# Patient Record
Sex: FEMALE | Race: WHITE | NOT HISPANIC OR LATINO | Employment: FULL TIME | URBAN - METROPOLITAN AREA
[De-identification: names, ages, dates, MRNs, and addresses within clinical notes are randomized per-mention and may not be internally consistent; named-entity substitution may affect disease eponyms.]

---

## 2017-11-09 ENCOUNTER — TRANSCRIBE ORDERS (OUTPATIENT)
Dept: PHYSICAL THERAPY | Facility: CLINIC | Age: 32
End: 2017-11-09

## 2017-11-09 DIAGNOSIS — M62.00 DIASTASIS OF MUSCLE: Primary | ICD-10-CM

## 2017-11-15 ENCOUNTER — TREATMENT (OUTPATIENT)
Dept: PHYSICAL THERAPY | Facility: CLINIC | Age: 32
End: 2017-11-15

## 2017-11-15 DIAGNOSIS — M62.08 DIASTASIS OF RECTUS ABDOMINIS: Primary | ICD-10-CM

## 2017-11-15 DIAGNOSIS — M53.3 SI (SACROILIAC) JOINT DYSFUNCTION: ICD-10-CM

## 2017-11-15 PROCEDURE — 97112 NEUROMUSCULAR REEDUCATION: CPT | Performed by: PHYSICAL THERAPIST

## 2017-11-15 PROCEDURE — 97161 PT EVAL LOW COMPLEX 20 MIN: CPT | Performed by: PHYSICAL THERAPIST

## 2017-11-15 PROCEDURE — 97140 MANUAL THERAPY 1/> REGIONS: CPT | Performed by: PHYSICAL THERAPIST

## 2017-11-15 NOTE — PROGRESS NOTES
Physical Therapy Initial Evaluation and Plan of Care    TIME IN 2:00 TIME OUT 3:15    Patient: Breanna Haley   : 1985  Diagnosis/ICD-10 Code:  Diastasis of rectus abdominis [M62.08]  Referring practitioner: Barbie Mahan MD  Date of Initial Visit: 11/15/2017  Today's Date: 11/15/2017  Patient seen for 1 sessions           Subjective Questionnaire: Oswestry: 30%      Subjective Evaluation    History of Present Illness  Date of onset: 5/15/2017  Mechanism of injury: Patient reports she had a  with her last child.  No complications.  She was able to continue to exercise but felt like she may have overdone as she had another toddler to care for and actually ripped open her sutures shortly after.  Last May went to [employer] and was carrying kids,  wearing back pack, lifting heavy suitcase. Felt like she might have torn something.  Gradual increase right lower quadrant pain and right SI since then.  Now working out makes it worse. Ok to walk.   Straining aggravates it.    Subjective comment:   No radiation into LE,  no numbness or tingling.  Denies bladder or bowel dysfunction.  (+) SI popping especially lift leg to dress  Patient Occupation: PA at [employer]   Precautions and Work Restrictions: NonePain  Location: right lower quadrant pain, right anterior hip with SI. Increases as day progresses  Quality: dull ache (tearing at umbilicus)  Relieving factors: change in position and rest (bath)  Exacerbated by: carrying baby on right hip, lifting, different everyday, SI - stairs, walking.  Progression: worsening    Social Support  Lives in: multiple-level home  Lives with: spouse and young children    Diagnostic Tests  CT scan: abnormal (mild rectus diastasis with convex bulgin of perumbilical linea alba, umbilical hernia)    Treatments  Previous treatment: medication  Current treatment: physical therapy  Patient Goals  Patient goals for therapy: decreased pain and return to sport/leisure activities              Objective     Special Questions      Additional Special Questions  Unremarkable      Postural Observations    Additional Postural Observation Details  Right upslip noted in standing and supine with apparent long left leg.    Palpation     Additional Palpation Details  Inguinal ligament    Tenderness     Additional Tenderness Details  AIIS    Neurological Testing   Sensation     Lumbar   Left   Intact: light touch    Right   Intact: light touch    Active Range of Motion     Lumbar   Flexion: 110 (hypermobile) degrees WFL  Extension: 20 (SI) degrees with pain  Left lateral flexion: 30 (SI) degrees with pain  Right lateral flexion: 22 degrees     Strength/Myotome Testing     Lumbar   Left   Normal strength    Right Hip   Planes of Motion   Flexion: 4  Extension: 5  Abduction: 4  Adduction: 4+  External rotation: 4+  Internal rotation: 4+    Right Knee   Flexion: 5  Extension: 5    Right Ankle/Foot   Dorsiflexion: 5  Plantar flexion: 5  Great toe extension: 5    Additional Strength Details  Left quadratus lumborum 4/5    Muscle Activation   Patient able to activate right transverse abdominals.     Tests     Lumbar     Right   Negative passive lumbar instability, passive SLR and valsalva.     Right Pelvic Girdle/Sacrum   Positive: thigh thrust.   Negative: sacrum compression and gapping.     Right Hip   Positive Gaenslen's.     Additional Tests Details  (+) active SLR with SI bracing, no improvement with abdominal spinting  Right hip circumduction with pain. Decreased right hip mobility  Guarding vs joint mobility.    General Comments     Spine Comments  Diastasis check with TA:  No increase palpable  With head lift: 2.5-3 fingers proximal linea alba         Assessment & Plan     Assessment  Impairments: abnormal muscle firing, abnormal muscle tone, activity intolerance, impaired physical strength and pain with function  Assessment details: Patient is a 33 yo female with onset of right lower quadrant pain after  lifting and carrying heavy loads.  H/o  with decreased healing.  Patient also presents with right SI dysfunction.  Patient appears to have 2.5-3 finger gap at rectus abdominis with active contraction.  Patient has a h/o joint hypermobility.  She does not have any neural or lumbar s/s at this time.  Lumbar spine is unremarkable.  Pain with right hip mobility testing with guarding vs. Joint capsule hypomobility. Patient has weakness in abdominal and lumbar stabilizers.  Slight weakness in hip and with attempts at LE while stabilizing with core.   Prognosis: good  Functional Limitations: carrying objects, lifting, walking, pulling, pushing, uncomfortable because of pain and reaching overhead  Goals  Plan Goals: STG X 3 weeks   Pt will:  1. Tolerate initial HEP  2. Perform advanced TrA retraining exercises with lumbar stability.  3. Increase right hip strength 1/2 grade while maintaining correct pelvic stabilitzation.    6 weeks. Pt will:  1. Report pain of </= 2/10 with all ADLs  2. Demonstrate pain free hip AROM while maintaining neutral spine and pelvic alignment  3. Oswestry </= 10%  4. Walk, stairs, and lifting child without increased strain or tearing sensation  5. Decrease diastasis by 1/2 finger.            Plan  Therapy options: will be seen for skilled physical therapy services  Planned modality interventions: cryotherapy, ultrasound, thermotherapy (hydrocollator packs) and high voltage pulsed current (pain management)  Planned therapy interventions: abdominal trunk stabilization, manual therapy, motor coordination training, neuromuscular re-education, body mechanics training, postural training, soft tissue mobilization, strengthening, stretching, joint mobilization and functional ROM exercises  Frequency: 2x week  Duration in weeks: 6  Treatment plan discussed with: patient        Manual Therapy:    8     mins  22969;  Therapeutic Exercise:    5    mins  29750;     Neuromuscular Freddie:    10    mins   53502;    Therapeutic Activity:           mins  25910;     Gait Training:            mins  32903;     Ultrasound:           mins  15165;    Electrical Stimulation:          mins  87207 ( );  Dry Needling           mins self-pay    Timed Treatment:   23   mins   Total Treatment:     75   mins    PT SIGNATURE: Ember Mark, PT   DATE TREATMENT INITIATED: 11/15/2017    Initial Certification  Certification Period: 2/13/2018  I certify that the therapy services are furnished while this patient is under my care.  The services outlined above are required by this patient, and will be reviewed every 90 days.     PHYSICIAN: Barbie Mahan MD      DATE:     Please sign and return via fax to [phone].. Thank you, [facility].

## 2017-11-16 NOTE — PATIENT INSTRUCTIONS
Patient was educated on findings of evaluation, purpose of treatment, and goals for therapy.  Treatment options discussed and questions answered.  Patient was educated on exercises/self treatment/pain relief techniques.  Please view [facility] Breanna Haley for a complete list of HEP instructions.

## 2017-11-20 ENCOUNTER — TREATMENT (OUTPATIENT)
Dept: PHYSICAL THERAPY | Facility: CLINIC | Age: 32
End: 2017-11-20

## 2017-11-20 DIAGNOSIS — M62.08 DIASTASIS OF RECTUS ABDOMINIS: Primary | ICD-10-CM

## 2017-11-20 DIAGNOSIS — M53.3 SI (SACROILIAC) JOINT DYSFUNCTION: ICD-10-CM

## 2017-11-20 PROCEDURE — 97140 MANUAL THERAPY 1/> REGIONS: CPT | Performed by: PHYSICAL THERAPIST

## 2017-11-20 PROCEDURE — 97110 THERAPEUTIC EXERCISES: CPT | Performed by: PHYSICAL THERAPIST

## 2017-11-20 PROCEDURE — 97035 APP MDLTY 1+ULTRASOUND EA 15: CPT | Performed by: PHYSICAL THERAPIST

## 2017-11-20 NOTE — PROGRESS NOTES
Physical Therapy Daily Progress Note    Time In 3:35  Time Out 4:35    Visit # : 2  Breanna Haley reports: sore. Stiff at anterior hip.  Ripping sensation at abdominals has not happened recently.    Subjective     Objective   See Exercise, Manual, and Modality Logs for complete treatment.   Tender anterior iliopsoas, none at adductors and painfree adduction  Right upslip with AINN    Assessment/Plan  Mild upslip with anterior innominate rotation.  Corrected with muscle technique. Good rectus approximation in quadraped.  No pain with new but fatigued.  Progress strengthening /stabilization /functional activity  Add heel slide, add march.    Assess new exercises and ultrasound.         Manual Therapy:    10     mins  55055;  Therapeutic Exercise:    30     mins  40397;     Neuromuscular Freddie:         mins  80882;    Therapeutic Activity:           mins  90084;     Gait Training:            mins  66523;     Ultrasound:     6/8     mins  18453;    Electrical Stimulation:          mins  18662 ( );  Dry Needling           mins self-pay    Timed Treatment:   38  mins   Total Treatment:     60   mins    Ember Mark, PT  Physical Therapist

## 2017-11-29 ENCOUNTER — TREATMENT (OUTPATIENT)
Dept: PHYSICAL THERAPY | Facility: CLINIC | Age: 32
End: 2017-11-29

## 2017-11-29 DIAGNOSIS — M53.3 SI (SACROILIAC) JOINT DYSFUNCTION: ICD-10-CM

## 2017-11-29 DIAGNOSIS — M62.08 DIASTASIS OF RECTUS ABDOMINIS: Primary | ICD-10-CM

## 2017-11-29 PROCEDURE — 97110 THERAPEUTIC EXERCISES: CPT | Performed by: PHYSICAL THERAPIST

## 2017-11-29 PROCEDURE — 97140 MANUAL THERAPY 1/> REGIONS: CPT | Performed by: PHYSICAL THERAPIST

## 2017-11-29 NOTE — PROGRESS NOTES
Physical Therapy Daily Progress Note    Time In 2:30  Time Out 4:00    Visit # : 3  Breanna Haley reports: SI is more sore but my hip feels good.  Seems to flareup when my child sits on my right hip. Didn't start to hurt again until Sunday.    Subjective     Objective   See Exercise, Manual, and Modality Logs for complete treatment.   Right upslip with AINN    Assessment/Plan  Patient presents with increased SI pain/grinding.  Corrected after mobilized and in alignment.  Decreased tenderness at iliopsoas with increased hip ROM.  Progress strengthening /stabilization /functional activity  Assess SI ultrasound  Vs iliopsoas         Manual Therapy:    8     mins  01188;  Therapeutic Exercise:    30     mins  59467;     Neuromuscular Freddie:         mins  60424;    Therapeutic Activity:           mins  97030;     Gait Training:            mins  91318;     Ultrasound:     6/8     mins  50422;    Electrical Stimulation:          mins  88280 ( );  Dry Needling           mins self-pay    Timed Treatment:   46   mins   Total Treatment:     90   mins    Ember Mark, PT  Physical Therapist

## 2017-12-06 ENCOUNTER — TREATMENT (OUTPATIENT)
Dept: PHYSICAL THERAPY | Facility: CLINIC | Age: 32
End: 2017-12-06

## 2017-12-06 DIAGNOSIS — M53.3 SI (SACROILIAC) JOINT DYSFUNCTION: ICD-10-CM

## 2017-12-06 DIAGNOSIS — M62.08 DIASTASIS OF RECTUS ABDOMINIS: Primary | ICD-10-CM

## 2017-12-06 DIAGNOSIS — S29.012A STRAIN OF RHOMBOID MUSCLE, INITIAL ENCOUNTER: ICD-10-CM

## 2017-12-06 PROCEDURE — 97035 APP MDLTY 1+ULTRASOUND EA 15: CPT | Performed by: PHYSICAL THERAPIST

## 2017-12-06 PROCEDURE — 97530 THERAPEUTIC ACTIVITIES: CPT | Performed by: PHYSICAL THERAPIST

## 2017-12-06 PROCEDURE — 97014 ELECTRIC STIMULATION THERAPY: CPT | Performed by: PHYSICAL THERAPIST

## 2017-12-06 PROCEDURE — 97110 THERAPEUTIC EXERCISES: CPT | Performed by: PHYSICAL THERAPIST

## 2017-12-06 NOTE — PROGRESS NOTES
Physical Therapy Daily Progress Note    Time In 10:30  Time Out 11:25    Visit # : 4  Breanna Haley reports: my SI was bothering me after I did more outside.  I did the self corrections and went to bed.  It was okay the next day.  My scapula is bothering me more today. Has been sore and in spasm at night for several months.  No injury but I hold my son with that arm a lot. No significant right hip pain the last two weeks.      Subjective     Objective   See Exercise, Manual, and Modality Logs for complete treatment.   Right shoulder AROM - full all direction  Serratus 4/5  Low trap 4-/5  Mid trap 4/5  Rhomboid 4/5 pain with all  No numbness or tingling  No tenderness spine or rib  Tender right rhomboid, mid trap  No pain with pa ribs  Mild right AINN, inflare.    Assessment/Plan  Patient presents with chronic scapular strain on right UE. Weak and painful on muscle testing.  No apparent joint or neuro involvement.   Progress per Plan of Care  Add treatment to right scapula to plan of care.  Manual, there. Ex, ultrasound, estim, and taping.         Manual Therapy:    5      mins  05617;  Therapeutic Exercise:    10    mins  43650;     Neuromuscular Freddie:         mins  32077;    Therapeutic Activity:     15      mins  26698;     Gait Training:            mins  17530;     Ultrasound:     6/8     mins  47900;    Electrical Stimulation:    15     mins  71140 ( );  Dry Needling           mins self-pay    Timed Treatment:   38   mins   Total Treatment:     55   mins    Ember Mark, PT  Physical Therapist

## 2017-12-20 ENCOUNTER — TREATMENT (OUTPATIENT)
Dept: PHYSICAL THERAPY | Facility: CLINIC | Age: 32
End: 2017-12-20

## 2017-12-20 DIAGNOSIS — M53.3 SI (SACROILIAC) JOINT DYSFUNCTION: ICD-10-CM

## 2017-12-20 DIAGNOSIS — S29.012A STRAIN OF RHOMBOID MUSCLE, INITIAL ENCOUNTER: ICD-10-CM

## 2017-12-20 DIAGNOSIS — M62.08 DIASTASIS OF RECTUS ABDOMINIS: Primary | ICD-10-CM

## 2017-12-20 PROCEDURE — 97530 THERAPEUTIC ACTIVITIES: CPT | Performed by: PHYSICAL THERAPIST

## 2017-12-20 PROCEDURE — 97014 ELECTRIC STIMULATION THERAPY: CPT | Performed by: PHYSICAL THERAPIST

## 2017-12-20 PROCEDURE — 97110 THERAPEUTIC EXERCISES: CPT | Performed by: PHYSICAL THERAPIST

## 2017-12-20 NOTE — PROGRESS NOTES
Physical Therapy Daily Progress Note    Time In 10:30  Time Out 11:30    Visit # : 5  Breanna Haley reports: I really don't have much pain any where now.    Subjective     Objective       Palpation   Left   Tenderness of the upper trapezius.     Right Tenderness of the upper trapezius.     Additional Palpation Details  No tenderness    Active Range of Motion     Lumbar   Normal active range of motion    Additional Active Range of Motion Details  No pain    Tests     Lumbar     Left   Negative passive SLR.     Right   Negative passive SLR.     Left Pelvic Girdle/Sacrum   Negative: sacrum compression, gapping and thigh thrust.     Right Pelvic Girdle/Sacrum   Negative: sacrum compression and gapping.     Left Hip   Negative Gaenslen's.     Right Hip   Negative Gaenslen's.      See Exercise, Manual, and Modality Logs for complete treatment.   Bilateral hips 5/5 without pain at SI or abdominals.    Assessment/Plan  No positive testing, no pain at abdomen, SI joint, or scapula.  Improved strength.  Progress strengthening /stabilization /functional activity  Continue PRN for strengthening         Manual Therapy:          mins  04636;  Therapeutic Exercise:    30     mins  50833;     Neuromuscular Freddie:         mins  28840;    Therapeutic Activity:     10     mins  99392;     Gait Training:            mins  48286;     Ultrasound:           mins  19838;    Electrical Stimulation:     15     mins  97113 ( );  Dry Needling           mins self-pay    Timed Treatment:   40   mins   Total Treatment:     60   mins    Ember Mark, PT  Physical Therapist

## 2018-05-02 ENCOUNTER — DOCUMENTATION (OUTPATIENT)
Dept: PHYSICAL THERAPY | Facility: CLINIC | Age: 33
End: 2018-05-02

## 2018-05-02 NOTE — PROGRESS NOTES
Discharge Summary  Discharge Summary from Physical Therapy Report      Dates  PT visit: 11/15/17-12/20/17  Number of Visits: 5     Discharge Status of Patient: See  Note dated 12/20/17    Goals: All Met    Discharge Plan: Continue with current home exercise program as instructed    Comments  Patient was to return as needed, no prior visits requested therefore formal discharge today.    Date of Discharge 5/2/18        Ember Mark, PT  Physical Therapist

## 2019-07-23 ENCOUNTER — OFFICE (AMBULATORY)
Dept: URBAN - METROPOLITAN AREA CLINIC 75 | Facility: CLINIC | Age: 34
End: 2019-07-23

## 2019-07-23 VITALS
WEIGHT: 192 LBS | HEIGHT: 64 IN | SYSTOLIC BLOOD PRESSURE: 120 MMHG | DIASTOLIC BLOOD PRESSURE: 76 MMHG | HEART RATE: 61 BPM

## 2019-07-23 DIAGNOSIS — R63.4 ABNORMAL WEIGHT LOSS: ICD-10-CM

## 2019-07-23 DIAGNOSIS — K21.9 GASTRO-ESOPHAGEAL REFLUX DISEASE WITHOUT ESOPHAGITIS: ICD-10-CM

## 2019-07-23 DIAGNOSIS — R19.7 DIARRHEA, UNSPECIFIED: ICD-10-CM

## 2019-07-23 DIAGNOSIS — R63.0 ANOREXIA: ICD-10-CM

## 2019-07-23 DIAGNOSIS — R11.0 NAUSEA: ICD-10-CM

## 2019-07-23 DIAGNOSIS — R10.13 EPIGASTRIC PAIN: ICD-10-CM

## 2019-07-23 PROCEDURE — 99203 OFFICE O/P NEW LOW 30 MIN: CPT | Performed by: INTERNAL MEDICINE

## 2019-08-13 VITALS
OXYGEN SATURATION: 100 % | OXYGEN SATURATION: 97 % | HEART RATE: 78 BPM | DIASTOLIC BLOOD PRESSURE: 60 MMHG | SYSTOLIC BLOOD PRESSURE: 115 MMHG | HEART RATE: 51 BPM | HEART RATE: 59 BPM | TEMPERATURE: 96.8 F | DIASTOLIC BLOOD PRESSURE: 52 MMHG | DIASTOLIC BLOOD PRESSURE: 59 MMHG | RESPIRATION RATE: 20 BRPM | DIASTOLIC BLOOD PRESSURE: 75 MMHG | RESPIRATION RATE: 14 BRPM | DIASTOLIC BLOOD PRESSURE: 72 MMHG | WEIGHT: 192 LBS | HEIGHT: 64 IN | RESPIRATION RATE: 18 BRPM | HEART RATE: 54 BPM | HEART RATE: 60 BPM | SYSTOLIC BLOOD PRESSURE: 106 MMHG | OXYGEN SATURATION: 99 % | SYSTOLIC BLOOD PRESSURE: 116 MMHG | HEART RATE: 65 BPM | RESPIRATION RATE: 8 BRPM | SYSTOLIC BLOOD PRESSURE: 114 MMHG | TEMPERATURE: 96.7 F

## 2019-08-14 ENCOUNTER — OFFICE (AMBULATORY)
Dept: URBAN - METROPOLITAN AREA PATHOLOGY 4 | Facility: PATHOLOGY | Age: 34
End: 2019-08-14
Payer: COMMERCIAL

## 2019-08-14 ENCOUNTER — AMBULATORY SURGICAL CENTER (AMBULATORY)
Dept: URBAN - METROPOLITAN AREA SURGERY 17 | Facility: SURGERY | Age: 34
End: 2019-08-14
Payer: COMMERCIAL

## 2019-08-14 DIAGNOSIS — K21.0 GASTRO-ESOPHAGEAL REFLUX DISEASE WITH ESOPHAGITIS: ICD-10-CM

## 2019-08-14 DIAGNOSIS — R10.13 EPIGASTRIC PAIN: ICD-10-CM

## 2019-08-14 DIAGNOSIS — T18.2XXA FOREIGN BODY IN STOMACH, INITIAL ENCOUNTER: ICD-10-CM

## 2019-08-14 DIAGNOSIS — R11.0 NAUSEA: ICD-10-CM

## 2019-08-14 DIAGNOSIS — K29.50 UNSPECIFIED CHRONIC GASTRITIS WITHOUT BLEEDING: ICD-10-CM

## 2019-08-14 DIAGNOSIS — R63.0 ANOREXIA: ICD-10-CM

## 2019-08-14 DIAGNOSIS — R63.4 ABNORMAL WEIGHT LOSS: ICD-10-CM

## 2019-08-14 DIAGNOSIS — K29.70 GASTRITIS, UNSPECIFIED, WITHOUT BLEEDING: ICD-10-CM

## 2019-08-14 PROBLEM — K20.9 ESOPHAGITIS, UNSPECIFIED: Status: ACTIVE | Noted: 2019-08-14

## 2019-08-14 LAB
GI HISTOLOGY: A. SELECT: (no result)
GI HISTOLOGY: PDF REPORT: (no result)

## 2019-08-14 PROCEDURE — 43239 EGD BIOPSY SINGLE/MULTIPLE: CPT | Performed by: INTERNAL MEDICINE

## 2019-08-14 PROCEDURE — 88305 TISSUE EXAM BY PATHOLOGIST: CPT | Performed by: INTERNAL MEDICINE

## 2019-08-14 RX ORDER — OMEPRAZOLE 20 MG/1
20 CAPSULE, DELAYED RELEASE ORAL
Qty: 30 | Refills: 5 | Status: ACTIVE
Start: 2019-08-14